# Patient Record
Sex: FEMALE | Race: WHITE
[De-identification: names, ages, dates, MRNs, and addresses within clinical notes are randomized per-mention and may not be internally consistent; named-entity substitution may affect disease eponyms.]

---

## 2020-09-14 ENCOUNTER — HOSPITAL ENCOUNTER (OUTPATIENT)
Dept: HOSPITAL 92 - SCSRAD | Age: 25
Discharge: HOME | End: 2020-09-14
Attending: FAMILY MEDICINE
Payer: COMMERCIAL

## 2020-09-14 DIAGNOSIS — M25.531: Primary | ICD-10-CM

## 2020-09-14 DIAGNOSIS — S52.501A: ICD-10-CM

## 2020-09-14 NOTE — RAD
EXAM: 3 views of the right wrist



HISTORY: Wrist pain after fall yesterday



COMPARISON: None



FINDINGS: 3 views of the right wrist shows an impacted fracture of the distal radial metaphysis. This
 appears extra-articular. Mild soft tissue swelling is seen. No degenerative changes are present.



IMPRESSION: Distal radius fracture



Reported By: Casa Petersen 

Electronically Signed:  9/14/2020 3:02 PM